# Patient Record
(demographics unavailable — no encounter records)

---

## 2025-07-03 NOTE — HISTORY OF PRESENT ILLNESS
[FreeTextEntry1] : The patient presents today with acute pain in the left heel present for the past 4 to 5 weeks.  The patient states the pain initially was 10/10 and has been somewhat reduced to 3-4/10 at this time.  Patient has been using a Thera gun and massaging her foot which is offered some relief but the patient states that she still has acute pain when she bears load.  Patient does have post attic pain in the morning when she arises from bed.  Patient has taken no medications to date for this problem.  Additionally the patient is complaining of pain on the right first metatarsal phalangeal joint plantar aspect along the tibial sesamoid bone.  There is no history of injury to this foot and there is been no swelling ecchymosis or edema noted at the site in question.  This problem is also going on for several weeks and there has been no treatment for this condition to date.

## 2025-07-03 NOTE — PHYSICAL EXAM
[General Appearance - Alert] : alert [General Appearance - In No Acute Distress] : in no acute distress [Ankle Swelling (On Exam)] : not present [Ankle Swelling Bilaterally] : bilaterally  [Varicose Veins Of Lower Extremities] : bilaterally [2+] : left foot dorsalis pedis 2+ [Abnormal Walk] : normal gait [Musculoskeletal - Swelling] : no joint swelling seen [Motor Tone] : muscle strength and tone were normal [FreeTextEntry1] : Examination of the feet reveals pain on palpation of the medial calcaneal tubercle of the left foot.  There is some tenderness along the medial band of the plantar fascia of the left foot as well.  There is no swelling edema or ecchymosis noted at the present time.  There is a tight Achilles tendon and plantar fascia on dorsiflexion of the foot on the leg.  Examination of the right foot reveals tenderness along the plantar medial aspect of the first metatarsal phalangeal joint along the tibial sesamoid.  There is a hallux abductovalgus deformity mild in nature.  There is pain along the plantar aspect of the tibial sesamoid upon dorsiflexion of the first metatarsal phalangeal joint.  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  There is no noted fracture of the sesamoid at the present time.  There is some evidence of a possible bipartite sesamoid although the radiographs are difficult to interpret at this time.  There is radiographic evidence of a plantar calcaneal heel spur bilaterally.  There is a pronated foot type noted with subtalar joint and midtarsal joint pronation radiographically. [Skin Color & Pigmentation] : normal skin color and pigmentation [Skin Turgor] : normal skin turgor [] : no rash [Skin Lesions] : no skin lesions [Foot Ulcer] : no foot ulcer [Skin Induration] : no skin induration [Sensation] : the sensory exam was normal to light touch and pinprick [No Focal Deficits] : no focal deficits [Deep Tendon Reflexes (DTR)] : deep tendon reflexes were 2+ and symmetric [Motor Exam] : the motor exam was normal [Oriented To Time, Place, And Person] : oriented to person, place, and time [Impaired Insight] : insight and judgment were intact [Affect] : the affect was normal

## 2025-07-03 NOTE — PROCEDURE
[FreeTextEntry1] : Radiographs AP and lateral views were taken of both feet at this presentation.  Radiographic findings are noted in the musculoskeletal section note of this report.  Treatment today for the heel pain consisted of a set of stretching exercises to be performed by the patient as well as the use of ice rolls.  A full mechanical explanation of the etiology of the pain and treatment course was performed with the patient.  The patient has been showing good understanding as to the etiology of the condition and shows good understanding of the course of treatment prescribed the patient will not only stretch and use ice but use an NSAID daily as prescribed.  We have discussed the possibility of a guided steroid injection on the next visit for treatment of the inferior calcaneal pain.  Similar discussion was performed for treatment of sesamoiditis and is our hope with the use of the NSAID will alleviate symptoms both at the sesamoid of the right foot as well as the inferior calcaneal tubercle of the left foot.  We have discussed the use of orthotic supports which the patient is to consider on her next visit.  We have submitted paperwork for authorization for fabrication of such devices.